# Patient Record
Sex: FEMALE | Race: BLACK OR AFRICAN AMERICAN | Employment: STUDENT | URBAN - METROPOLITAN AREA
[De-identification: names, ages, dates, MRNs, and addresses within clinical notes are randomized per-mention and may not be internally consistent; named-entity substitution may affect disease eponyms.]

---
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
## 2019-01-28 ENCOUNTER — HOSPITAL ENCOUNTER (EMERGENCY)
Age: 19
Discharge: HOME | End: 2019-01-28
Attending: EMERGENCY MEDICINE | Admitting: EMERGENCY MEDICINE
Payer: COMMERCIAL

## 2019-01-28 VITALS
RESPIRATION RATE: 16 BRPM | SYSTOLIC BLOOD PRESSURE: 130 MMHG | HEART RATE: 69 BPM | HEIGHT: 62 IN | OXYGEN SATURATION: 98 % | WEIGHT: 150 LBS | TEMPERATURE: 97.9 F | DIASTOLIC BLOOD PRESSURE: 59 MMHG | BODY MASS INDEX: 27.6 KG/M2

## 2019-01-28 DIAGNOSIS — N30.00 ACUTE CYSTITIS WITHOUT HEMATURIA: Primary | ICD-10-CM

## 2019-01-28 LAB
APPEARANCE UR: ABNORMAL
BACTERIA URNS QL MICRO: ABNORMAL /HPF
BILIRUB UR QL: NEGATIVE
COLOR UR: YELLOW
GLUCOSE UR STRIP.AUTO-MCNC: NEGATIVE MG/DL
HCG UR QL: NEGATIVE
HGB UR QL STRIP: ABNORMAL
KETONES UR QL STRIP.AUTO: ABNORMAL MG/DL
LEUKOCYTE ESTERASE UR QL STRIP.AUTO: ABNORMAL
NITRITE UR QL STRIP.AUTO: POSITIVE
PH UR STRIP: 6 [PH] (ref 5–8)
PROT UR STRIP-MCNC: 100 MG/DL
RBC #/AREA URNS HPF: ABNORMAL /HPF (ref 0–5)
SP GR UR REFRACTOMETRY: 1.03 (ref 1–1.03)
UROBILINOGEN UR QL STRIP.AUTO: 0.2 EU/DL (ref 0.2–1)
WBC URNS QL MICRO: ABNORMAL /HPF (ref 0–4)

## 2019-01-28 PROCEDURE — 74011250637 HC RX REV CODE- 250/637: Performed by: EMERGENCY MEDICINE

## 2019-01-28 PROCEDURE — 81001 URINALYSIS AUTO W/SCOPE: CPT

## 2019-01-28 PROCEDURE — 81025 URINE PREGNANCY TEST: CPT

## 2019-01-28 PROCEDURE — 99283 EMERGENCY DEPT VISIT LOW MDM: CPT

## 2019-01-28 RX ORDER — NITROFURANTOIN 25; 75 MG/1; MG/1
100 CAPSULE ORAL 2 TIMES DAILY
Status: DISCONTINUED | OUTPATIENT
Start: 2019-01-28 | End: 2019-01-28 | Stop reason: HOSPADM

## 2019-01-28 RX ORDER — PHENAZOPYRIDINE HYDROCHLORIDE 100 MG/1
200 TABLET, FILM COATED ORAL ONCE
Status: COMPLETED | OUTPATIENT
Start: 2019-01-28 | End: 2019-01-28

## 2019-01-28 RX ORDER — NITROFURANTOIN 25; 75 MG/1; MG/1
100 CAPSULE ORAL 2 TIMES DAILY
Qty: 10 CAP | Refills: 0 | Status: SHIPPED | OUTPATIENT
Start: 2019-01-28 | End: 2019-02-02

## 2019-01-28 RX ORDER — PHENAZOPYRIDINE HYDROCHLORIDE 200 MG/1
200 TABLET, FILM COATED ORAL 3 TIMES DAILY
Qty: 6 TAB | Refills: 0 | Status: SHIPPED | OUTPATIENT
Start: 2019-01-28 | End: 2019-01-30

## 2019-01-28 RX ADMIN — NITROFURANTOIN (MONOHYDRATE/MACROCRYSTALS) 100 MG: 75; 25 CAPSULE ORAL at 08:35

## 2019-01-28 RX ADMIN — PHENAZOPYRIDINE HYDROCHLORIDE 200 MG: 100 TABLET ORAL at 08:35

## 2019-01-28 NOTE — DISCHARGE INSTRUCTIONS

## 2019-01-28 NOTE — ED PROVIDER NOTES
EMERGENCY DEPARTMENT HISTORY AND PHYSICAL EXAM 
 
7:56 AM 
 
 
Date: 1/28/2019 Patient Name: Jessee Cardozo History of Presenting Illness Chief Complaint Patient presents with  Urinary Pain History Provided By: Patient Chief Complaint: Dysuria Duration:  Hours Timing:  Acute Location: Urinary tract Quality: Aching and Burning Severity: Mild Modifying Factors: upon urination Associated Symptoms: denies any other associated signs or symptoms Additional History (Context): Jessee Cardozo is a 22 y.o. female with No significant past medical history who presents with acute dysuria. Pt states that just this morning she felt aching, burning sensation upon urination. She denies discharge,  odor, hematuria, back pain. LNMP started 1 day ago. Pt is not concerned for STDs. No PSHx. No etOH or tobacco use. PCP: None Past History Past Medical History: 
History reviewed. No pertinent past medical history. Past Surgical History: 
History reviewed. No pertinent surgical history. Family History: 
History reviewed. No pertinent family history. Social History: 
Social History Tobacco Use  Smoking status: Current Every Day Smoker  Smokeless tobacco: Never Used Substance Use Topics  Alcohol use: Yes Frequency: Never  Drug use: Yes Types: Marijuana Allergies: 
No Known Allergies Review of Systems Review of Systems Constitutional: Negative for chills. HENT: Negative for congestion and sore throat. Respiratory: Negative for cough and shortness of breath. Cardiovascular: Negative for chest pain. Gastrointestinal: Positive for abdominal pain (cramps). Negative for diarrhea, nausea and vomiting. Genitourinary: Positive for dysuria. Negative for flank pain, hematuria and vaginal discharge.  
     - vaginal odor Musculoskeletal: Negative for back pain. Skin: Negative for rash. Neurological: Negative for dizziness and headaches. All other systems reviewed and are negative. Physical Exam  
 
Visit Vitals /59 (BP 1 Location: Right arm, BP Patient Position: Sitting) Pulse 69 Temp 97.9 °F (36.6 °C) Resp 16 Ht 5' 2\" (1.575 m) Wt 68 kg (150 lb) SpO2 98% BMI 27.44 kg/m² Physical Exam 
General Exam: Patient is a well developed and well nourished in no distress. Patient does not appear acutely ill or toxic. Eye Exam: Lids and conjunctiva are normal 
ENT Exam: The general head and facial exam is normal.  The neck is supple without meningeal signs. No significant adenopathy. Pulmonary Exam: No respiratory distress. The respiratory rate is normal.  No stridor. The breath sounds are equal bilaterally. There are no wheezes, rales, or rhonchi noted. Cardiac Exam: The cardiac rate and rhythm are normal.  No significant murmurs, rubs, or gallops. The peripheral pulses are normal. 
Abdominal Exam: Abdomen is soft and non-distended. No pulsatile masses. There is no local tenderness. There is no rebound or guarding noted. Skin and Soft Tissue: The skin is warm and dry, without significant abnormality. Good color. Musculoskeletal Exam: No peripheral edema. The musculoskeletal exam of the lower extremities is normal without significant local tenderness. Psychiatric: Normal adult with appropriate demeanor and interpersonal interaction. Is oriented to person, place, and time. Diagnostic Study Results Labs - Recent Results (from the past 12 hour(s)) URINALYSIS W/ RFLX MICROSCOPIC Collection Time: 01/28/19  7:10 AM  
Result Value Ref Range Color YELLOW Appearance CLOUDY Specific gravity 1.028 1.005 - 1.030    
 pH (UA) 6.0 5.0 - 8.0 Protein 100 (A) NEG mg/dL Glucose NEGATIVE  NEG mg/dL Ketone TRACE (A) NEG mg/dL Bilirubin NEGATIVE  NEG Blood LARGE (A) NEG Urobilinogen 0.2 0.2 - 1.0 EU/dL Nitrites POSITIVE (A) NEG Leukocyte Esterase LARGE (A) NEG    
HCG URINE, QL Collection Time: 01/28/19  7:10 AM  
Result Value Ref Range HCG urine, QL NEGATIVE  NEG    
URINE MICROSCOPIC ONLY Collection Time: 01/28/19  7:10 AM  
Result Value Ref Range WBC TOO NUMEROUS TO COUNT 0 - 4 /hpf  
 RBC TOO NUMEROUS TO COUNT 0 - 5 /hpf Bacteria 4+ (A) NEG /hpf Radiologic Studies - No orders to display Medical Decision Making It should be noted that I, Divine Ragsdale MD will be the provider of record for this patient. I reviewed the vital signs, available nursing notes, past medical history, past surgical history, family history and social history. Vital Signs-Reviewed the patient's vital signs. Pulse Oximetry Analysis -  98% on room air (Interpretation) wnl 
 
Cardiac Monitor: 
Rate: 69 Records Reviewed: Nursing Notes (Time of Review: 7:56 AM) 
 
ED Course: Progress Notes, Reevaluation, and Consults: 
 
 
Provider Notes (Medical Decision Making): Pt with dysuria. No vaginal discharge. Denies pelvic or abdominal pain (has menses related cramps) - abdominal exam benign. Do not feel pt needs pelvic and pt declines pelvic exam. U/A consistent with UTI. Preg neg. Does not appear septic. Do not feel labs indicated. Will treat with macrobid and pyridium. Aware of return precautions. Comfortable appearing and ambulatory at discharge. Diagnosis Clinical Impression: 1. Acute cystitis without hematuria Disposition: Discharge Follow-up Information Follow up With Specialties Details Why Contact [facility] EMERGENCY DEPT Emergency Medicine  If symptoms worsen [address] 
[phone] [place]  Schedule an appointment as soon as possible for a visit for a family doctor. [address] ([facility]) [address] [phone] Medication List  
  
START taking these medications nitrofurantoin (macrocrystal-monohydrate) 100 mg capsule Commonly known as:  MACROBID Take 1 Cap by mouth two (2) times a day for 5 days. phenazopyridine 200 mg tablet Commonly known as:  PYRIDIUM Take 1 Tab by mouth three (3) times daily for 2 days. Where to Get Your Medications Information about where to get these medications is not yet available Ask your nurse or doctor about these medications · nitrofurantoin (macrocrystal-monohydrate) 100 mg capsule · phenazopyridine 200 mg tablet 
  
 
_______________________________ Scribe Attestation Janie Altman acting as a scribe for and in the presence of Chetan Millard MD     
January 28, 2019 at 7:56 AM 
    
Provider Attestation:     
I personally performed the services described in the documentation, reviewed the documentation, as recorded by the scribe in my presence, and it accurately and completely records my words and actions. January 28, 2019 at 7:56 AM - Chetan Millard MD   
 
 
_______________________________

## 2019-11-06 NOTE — ED TRIAGE NOTES
\"I'm having vaginal issues. I'm burning when I urinate. I'm aching too. \" reports starting menstrual cycle yesterday.
12-Oct-2019

## 2023-08-21 NOTE — LETTER
NOTIFICATION RETURN TO WORK / SCHOOL 
 
1/28/2019 8:51 AM 
 
Ms. Somers Has [address] To Whom It May Concern: 
 
Lizbet Has is currently under the care of [facility] EMERGENCY DEPT. She will return to work/school on: 1/29/2019 If there are questions or concerns please have the patient contact our office.  
 
 
 
Sincerely, 
 
 
 
 
 
Al Jeffrey MD  
                                
 
 2 seconds or less

## 2024-07-07 NOTE — CALL BACK NOTE
Pt d/c home with Keflex, not susceptible. Pt called, no answer. Attempt to call back later and will sent out letter.   
Reyes Ford PA-C 11:02 AM 
  
 07-Jul-2024 01:24